# Patient Record
Sex: FEMALE | Race: WHITE | NOT HISPANIC OR LATINO | Employment: FULL TIME | URBAN - METROPOLITAN AREA
[De-identification: names, ages, dates, MRNs, and addresses within clinical notes are randomized per-mention and may not be internally consistent; named-entity substitution may affect disease eponyms.]

---

## 2018-03-01 ENCOUNTER — OFFICE VISIT (OUTPATIENT)
Dept: OBSTETRICS AND GYNECOLOGY | Facility: CLINIC | Age: 48
End: 2018-03-01

## 2018-03-01 VITALS
SYSTOLIC BLOOD PRESSURE: 112 MMHG | WEIGHT: 134 LBS | BODY MASS INDEX: 22.88 KG/M2 | HEIGHT: 64 IN | HEART RATE: 73 BPM | DIASTOLIC BLOOD PRESSURE: 72 MMHG

## 2018-03-01 DIAGNOSIS — B97.7 HPV IN FEMALE: Primary | ICD-10-CM

## 2018-03-01 PROCEDURE — 17110 DESTRUCTION B9 LES UP TO 14: CPT | Performed by: OBSTETRICS & GYNECOLOGY

## 2018-03-01 PROCEDURE — 99203 OFFICE O/P NEW LOW 30 MIN: CPT | Performed by: OBSTETRICS & GYNECOLOGY

## 2018-03-01 NOTE — PROGRESS NOTES
Subjective   Rocío Hamilton is a 47 y.o. female is being seen for HPV follow up  Chief Complaint   Patient presents with   • Follow-up     HPV positive.         History of Present Illness  Patient denies history of abnormal Pap smears.  She got a Pap smear in January 2018 that showed normal cytology with high-risk HPV positive.  This was pulled high risk testing.  She denies any abnormal bleeding including postcoital spotting.  She denies any other symptoms however she did notice in the last week a new lesion at the bottom part of her vaginal opening.  She denies being diagnosed with genital warts in the past, but is worried this might be a wart.    Past Medical History:   Diagnosis Date   • Chlamydia     when 22 yrs   • Yeast infection      History reviewed. No pertinent surgical history.  Social History   Substance Use Topics   • Smoking status: Former Smoker     Quit date: 3/1/2007   • Smokeless tobacco: Never Used   • Alcohol use Yes     Family History   Problem Relation Age of Onset   • Breast cancer Neg Hx    • Ovarian cancer Neg Hx    • Uterine cancer Neg Hx    • Colon cancer Neg Hx         Review of Systems   Constitutional: Negative for chills, fever and unexpected weight change.   HENT: Negative for congestion, nosebleeds and sore throat.    Eyes: Negative for visual disturbance.   Respiratory: Negative for cough, chest tightness and shortness of breath.    Cardiovascular: Negative for chest pain and palpitations.   Gastrointestinal: Negative for abdominal pain, constipation, diarrhea, nausea and vomiting.   Endocrine: Negative for polyuria.   Genitourinary: Negative for difficulty urinating, dyspareunia, dysuria, frequency, genital sores, hematuria, menstrual problem, pelvic pain, urgency, vaginal bleeding, vaginal discharge and vaginal pain.   Musculoskeletal: Negative for arthralgias and joint swelling.   Skin: Negative for color change and rash.        Lesion on vagina     Neurological: Negative for  "dizziness, light-headedness and headaches.   Hematological: Does not bruise/bleed easily.   Psychiatric/Behavioral: Negative for dysphoric mood. The patient is not nervous/anxious.        Objective    /72  Pulse 73  Ht 162.6 cm (64\")  Wt 60.8 kg (134 lb)  LMP 02/08/2018 (Exact Date)  BMI 23 kg/m2   Physical Exam   Constitutional: She is oriented to person, place, and time. She appears well-developed and well-nourished. No distress.   HENT:   Head: Normocephalic and atraumatic.   Eyes: EOM are normal. No scleral icterus.   Pulmonary/Chest: Effort normal and breath sounds normal.   Abdominal: There is no tenderness.   Genitourinary:         Genitourinary Comments: Genital wart as noted by red on vulvar picture  Otherwise no lesions   Neurological: She is alert and oriented to person, place, and time.   Skin: Skin is warm and dry. She is not diaphoretic.   Psychiatric: She has a normal mood and affect. Her behavior is normal. Judgment and thought content normal.       Assessment/Plan   Rocío was seen today for follow-up.    Diagnoses and all orders for this visit:    HPV in female    Patient with normal Pap smear in January 2018 with high-risk HPV positive.  According to [facility] guidelines patient should come for repeat Pap smear with HPV testing in 12 months.  I offered patient this management versus colposcopy.  I explained the procedure of colposcopy.  Patient would like to wait for 12 months and repeat the Pap smear is reasonable given that this is her first abnormal.  Counseled on the importance of close follow-up, progression.    Genital wart  Lesion consistent with typical genital wart noted by patient in the last month.  On exam and a small genital where the posterior fourchette.  TCA applied as per procedure note.  Recommend follow-up in 1 week for repeat application.  Will biopsy if no resolution with treatment.  Counseled on transmission of HPV and relationship between different types (those on pap " smear vs. Those that cause genital warts.      I spent greater than 20 minutes of 30 minute visit in face-to-face consultation with patient counseling as per above.

## 2018-03-01 NOTE — PROGRESS NOTES
Procedure   Procedures    Procedure   Procedures    Subjective   Rocío Hamilton is a 47 y.o. female who needs treatment of genital warts. 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     Objective    Skin: One wart(s) noted on posterior fourchette. Size range is <0.5 cm.     Assessment/Plan   Genital warts (condyloma yoselyn)    1. TCA was applied to one wart(s).   2. Patient tolerated procedure well.   3. Discussed other options for treatment including possible need for biopsy or excision without improvement.  Return in one week for repeat application.

## 2018-03-08 ENCOUNTER — OFFICE VISIT (OUTPATIENT)
Dept: OBSTETRICS AND GYNECOLOGY | Facility: CLINIC | Age: 48
End: 2018-03-08

## 2018-03-08 VITALS
BODY MASS INDEX: 22.88 KG/M2 | DIASTOLIC BLOOD PRESSURE: 65 MMHG | WEIGHT: 134 LBS | HEART RATE: 68 BPM | SYSTOLIC BLOOD PRESSURE: 106 MMHG | HEIGHT: 64 IN

## 2018-03-08 DIAGNOSIS — A63.0 GENITAL WARTS: Primary | ICD-10-CM

## 2018-03-08 PROCEDURE — 17110 DESTRUCTION B9 LES UP TO 14: CPT | Performed by: OBSTETRICS & GYNECOLOGY

## 2018-03-08 NOTE — PROGRESS NOTES
"Subjective   Rocío Hamilton is a 47 y.o. female   Chief Complaint   Patient presents with   • Follow-up     wart treatment     History of Present Illness  Reports that area has improved. No pain  Took diflucan she had at home for yeast infection and feels better now. Declines further work up  Past Medical History:   Diagnosis Date   • Abnormal Pap smear of cervix     Pap smear in January 2018 that showed normal cytology with high-risk HPV positive   • Chlamydia     when 22 yrs   • Yeast infection      No past surgical history on file.  Social History   Substance Use Topics   • Smoking status: Former Smoker     Quit date: 3/1/2007   • Smokeless tobacco: Never Used   • Alcohol use Yes     Family History   Problem Relation Age of Onset   • Breast cancer Neg Hx    • Ovarian cancer Neg Hx    • Uterine cancer Neg Hx    • Colon cancer Neg Hx         Review of Systems   Constitutional: Negative.    HENT: Negative.    Respiratory: Negative.    Genitourinary: Positive for vaginal discharge (resolving). Negative for vaginal pain.   Musculoskeletal: Negative.    Neurological: Negative.    Hematological: Negative.        Objective    /65  Pulse 68  Ht 162.6 cm (64.02\")  Wt 60.8 kg (134 lb)  LMP 02/08/2018 (Exact Date)  BMI 22.99 kg/m2   Physical Exam   Constitutional: She is oriented to person, place, and time. She appears well-developed and well-nourished. No distress.   HENT:   Head: Normocephalic and atraumatic.   Eyes: EOM are normal. No scleral icterus.   Pulmonary/Chest: Effort normal and breath sounds normal.   Abdominal: There is no tenderness.   Genitourinary:         Neurological: She is alert and oriented to person, place, and time.   Skin: Skin is warm and dry. She is not diaphoretic.   Psychiatric: She has a normal mood and affect. Her behavior is normal. Judgment and thought content normal.         Assessment/Plan   Rocío was seen today for follow-up.    Diagnoses and all orders for this " visit:    Genital warts      Procedures    Subjective   Rocío Hamilton is a 47 y.o. female who needs treatment of genital warts. 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 She received one TCA application last week    Objective    Skin: Small wart noted on posterior fourchette as above. Size range is <1cm cm.     Assessment/Plan   Genital warts (condyloma yoselyn)    1. TCA was applied to one wart(s).   2. Patient tolerated procedure well.   3. Discussed other options for treatment including possible need for biopsy or excision without improvement

## 2025-01-31 ENCOUNTER — OFFICE (AMBULATORY)
Dept: URBAN - METROPOLITAN AREA PATHOLOGY 4 | Facility: PATHOLOGY | Age: 55
End: 2025-01-31
Payer: COMMERCIAL

## 2025-01-31 ENCOUNTER — AMBULATORY SURGICAL CENTER (AMBULATORY)
Dept: URBAN - METROPOLITAN AREA SURGERY 17 | Facility: SURGERY | Age: 55
End: 2025-01-31
Payer: COMMERCIAL

## 2025-01-31 VITALS
HEART RATE: 81 BPM | DIASTOLIC BLOOD PRESSURE: 58 MMHG | SYSTOLIC BLOOD PRESSURE: 127 MMHG | RESPIRATION RATE: 11 BRPM | SYSTOLIC BLOOD PRESSURE: 131 MMHG | RESPIRATION RATE: 14 BRPM | HEART RATE: 100 BPM | HEART RATE: 73 BPM | DIASTOLIC BLOOD PRESSURE: 72 MMHG | OXYGEN SATURATION: 100 % | OXYGEN SATURATION: 98 % | HEART RATE: 74 BPM | RESPIRATION RATE: 20 BRPM | RESPIRATION RATE: 12 BRPM | HEART RATE: 92 BPM | HEIGHT: 64 IN | OXYGEN SATURATION: 99 % | TEMPERATURE: 97.5 F | SYSTOLIC BLOOD PRESSURE: 130 MMHG | HEART RATE: 72 BPM | SYSTOLIC BLOOD PRESSURE: 125 MMHG | DIASTOLIC BLOOD PRESSURE: 68 MMHG | DIASTOLIC BLOOD PRESSURE: 60 MMHG | DIASTOLIC BLOOD PRESSURE: 66 MMHG | DIASTOLIC BLOOD PRESSURE: 74 MMHG | DIASTOLIC BLOOD PRESSURE: 76 MMHG | RESPIRATION RATE: 16 BRPM | RESPIRATION RATE: 15 BRPM | HEART RATE: 68 BPM | DIASTOLIC BLOOD PRESSURE: 57 MMHG | SYSTOLIC BLOOD PRESSURE: 105 MMHG | RESPIRATION RATE: 22 BRPM | HEART RATE: 86 BPM | SYSTOLIC BLOOD PRESSURE: 112 MMHG | HEART RATE: 89 BPM | SYSTOLIC BLOOD PRESSURE: 114 MMHG | RESPIRATION RATE: 19 BRPM | SYSTOLIC BLOOD PRESSURE: 124 MMHG | SYSTOLIC BLOOD PRESSURE: 113 MMHG | WEIGHT: 140 LBS | DIASTOLIC BLOOD PRESSURE: 56 MMHG | HEART RATE: 80 BPM | SYSTOLIC BLOOD PRESSURE: 134 MMHG | DIASTOLIC BLOOD PRESSURE: 81 MMHG | TEMPERATURE: 97.2 F | HEART RATE: 79 BPM | SYSTOLIC BLOOD PRESSURE: 106 MMHG | HEART RATE: 76 BPM | HEART RATE: 71 BPM

## 2025-01-31 DIAGNOSIS — Z12.11 ENCOUNTER FOR SCREENING FOR MALIGNANT NEOPLASM OF COLON: ICD-10-CM

## 2025-01-31 DIAGNOSIS — K63.5 POLYP OF COLON: ICD-10-CM

## 2025-01-31 PROCEDURE — 88305 TISSUE EXAM BY PATHOLOGIST: CPT | Performed by: PATHOLOGY

## 2025-01-31 PROCEDURE — 45385 COLONOSCOPY W/LESION REMOVAL: CPT | Mod: 33 | Performed by: INTERNAL MEDICINE
